# Patient Record
Sex: MALE | Race: BLACK OR AFRICAN AMERICAN | NOT HISPANIC OR LATINO | Employment: STUDENT | URBAN - METROPOLITAN AREA
[De-identification: names, ages, dates, MRNs, and addresses within clinical notes are randomized per-mention and may not be internally consistent; named-entity substitution may affect disease eponyms.]

---

## 2024-08-25 ENCOUNTER — HOSPITAL ENCOUNTER (EMERGENCY)
Facility: HOSPITAL | Age: 24
Discharge: PSYCHIATRIC OR SUBSTANCE USE FACILITY | End: 2024-08-25
Attending: EMERGENCY MEDICINE
Payer: MEDICAID

## 2024-08-25 VITALS
TEMPERATURE: 98 F | HEART RATE: 84 BPM | OXYGEN SATURATION: 100 % | DIASTOLIC BLOOD PRESSURE: 63 MMHG | SYSTOLIC BLOOD PRESSURE: 131 MMHG | RESPIRATION RATE: 18 BRPM

## 2024-08-25 DIAGNOSIS — F22 PARANOID BEHAVIOR: ICD-10-CM

## 2024-08-25 DIAGNOSIS — R45.1 AGITATION: Primary | ICD-10-CM

## 2024-08-25 LAB
ALBUMIN SERPL BCP-MCNC: 4.8 G/DL (ref 3.5–5.2)
ALP SERPL-CCNC: 63 U/L (ref 55–135)
ALT SERPL W/O P-5'-P-CCNC: 14 U/L (ref 10–44)
AMPHET+METHAMPHET UR QL: NEGATIVE
ANION GAP SERPL CALC-SCNC: 12 MMOL/L (ref 8–16)
APAP SERPL-MCNC: <3 UG/ML (ref 10–20)
AST SERPL-CCNC: 21 U/L (ref 10–40)
BARBITURATES UR QL SCN>200 NG/ML: NEGATIVE
BASOPHILS # BLD AUTO: 0.06 K/UL (ref 0–0.2)
BASOPHILS NFR BLD: 0.4 % (ref 0–1.9)
BENZODIAZ UR QL SCN>200 NG/ML: NEGATIVE
BILIRUB SERPL-MCNC: 1.3 MG/DL (ref 0.1–1)
BILIRUB UR QL STRIP: NEGATIVE
BUN SERPL-MCNC: 15 MG/DL (ref 6–20)
BZE UR QL SCN: NEGATIVE
CALCIUM SERPL-MCNC: 9.8 MG/DL (ref 8.7–10.5)
CANNABINOIDS UR QL SCN: ABNORMAL
CHLORIDE SERPL-SCNC: 110 MMOL/L (ref 95–110)
CLARITY UR REFRACT.AUTO: CLEAR
CO2 SERPL-SCNC: 19 MMOL/L (ref 23–29)
COLOR UR AUTO: YELLOW
CREAT SERPL-MCNC: 1 MG/DL (ref 0.5–1.4)
CREAT UR-MCNC: 128 MG/DL (ref 23–375)
DIFFERENTIAL METHOD BLD: ABNORMAL
EOSINOPHIL # BLD AUTO: 0 K/UL (ref 0–0.5)
EOSINOPHIL NFR BLD: 0.1 % (ref 0–8)
ERYTHROCYTE [DISTWIDTH] IN BLOOD BY AUTOMATED COUNT: 13 % (ref 11.5–14.5)
EST. GFR  (NO RACE VARIABLE): >60 ML/MIN/1.73 M^2
ETHANOL SERPL-MCNC: <10 MG/DL
GLUCOSE SERPL-MCNC: 95 MG/DL (ref 70–110)
GLUCOSE UR QL STRIP: NEGATIVE
HCT VFR BLD AUTO: 42.7 % (ref 40–54)
HGB BLD-MCNC: 14.2 G/DL (ref 14–18)
HGB UR QL STRIP: NEGATIVE
IMM GRANULOCYTES # BLD AUTO: 0.04 K/UL (ref 0–0.04)
IMM GRANULOCYTES NFR BLD AUTO: 0.3 % (ref 0–0.5)
KETONES UR QL STRIP: ABNORMAL
LEUKOCYTE ESTERASE UR QL STRIP: NEGATIVE
LYMPHOCYTES # BLD AUTO: 1.9 K/UL (ref 1–4.8)
LYMPHOCYTES NFR BLD: 12.3 % (ref 18–48)
MCH RBC QN AUTO: 28.8 PG (ref 27–31)
MCHC RBC AUTO-ENTMCNC: 33.3 G/DL (ref 32–36)
MCV RBC AUTO: 87 FL (ref 82–98)
METHADONE UR QL SCN>300 NG/ML: NEGATIVE
MONOCYTES # BLD AUTO: 1.1 K/UL (ref 0.3–1)
MONOCYTES NFR BLD: 6.8 % (ref 4–15)
NEUTROPHILS # BLD AUTO: 12.6 K/UL (ref 1.8–7.7)
NEUTROPHILS NFR BLD: 80.1 % (ref 38–73)
NITRITE UR QL STRIP: NEGATIVE
NRBC BLD-RTO: 0 /100 WBC
OPIATES UR QL SCN: NEGATIVE
PCP UR QL SCN>25 NG/ML: NEGATIVE
PH UR STRIP: 6 [PH] (ref 5–8)
PLATELET # BLD AUTO: 314 K/UL (ref 150–450)
PMV BLD AUTO: 10 FL (ref 9.2–12.9)
POTASSIUM SERPL-SCNC: 3.5 MMOL/L (ref 3.5–5.1)
PROT SERPL-MCNC: 8 G/DL (ref 6–8.4)
PROT UR QL STRIP: ABNORMAL
RBC # BLD AUTO: 4.93 M/UL (ref 4.6–6.2)
SALICYLATES SERPL-MCNC: <5 MG/DL (ref 15–30)
SODIUM SERPL-SCNC: 141 MMOL/L (ref 136–145)
SP GR UR STRIP: 1.02 (ref 1–1.03)
TOXICOLOGY INFORMATION: ABNORMAL
TSH SERPL DL<=0.005 MIU/L-ACNC: 1.63 UIU/ML (ref 0.4–4)
URN SPEC COLLECT METH UR: ABNORMAL
WBC # BLD AUTO: 15.71 K/UL (ref 3.9–12.7)

## 2024-08-25 PROCEDURE — 81003 URINALYSIS AUTO W/O SCOPE: CPT | Mod: 59 | Performed by: STUDENT IN AN ORGANIZED HEALTH CARE EDUCATION/TRAINING PROGRAM

## 2024-08-25 PROCEDURE — 80053 COMPREHEN METABOLIC PANEL: CPT | Performed by: STUDENT IN AN ORGANIZED HEALTH CARE EDUCATION/TRAINING PROGRAM

## 2024-08-25 PROCEDURE — 63600175 PHARM REV CODE 636 W HCPCS: Performed by: EMERGENCY MEDICINE

## 2024-08-25 PROCEDURE — 84443 ASSAY THYROID STIM HORMONE: CPT | Performed by: STUDENT IN AN ORGANIZED HEALTH CARE EDUCATION/TRAINING PROGRAM

## 2024-08-25 PROCEDURE — 96372 THER/PROPH/DIAG INJ SC/IM: CPT | Performed by: EMERGENCY MEDICINE

## 2024-08-25 PROCEDURE — 85025 COMPLETE CBC W/AUTO DIFF WBC: CPT | Performed by: STUDENT IN AN ORGANIZED HEALTH CARE EDUCATION/TRAINING PROGRAM

## 2024-08-25 PROCEDURE — 80307 DRUG TEST PRSMV CHEM ANLYZR: CPT | Performed by: STUDENT IN AN ORGANIZED HEALTH CARE EDUCATION/TRAINING PROGRAM

## 2024-08-25 PROCEDURE — 82077 ASSAY SPEC XCP UR&BREATH IA: CPT | Performed by: STUDENT IN AN ORGANIZED HEALTH CARE EDUCATION/TRAINING PROGRAM

## 2024-08-25 PROCEDURE — 99285 EMERGENCY DEPT VISIT HI MDM: CPT | Mod: 25

## 2024-08-25 PROCEDURE — 80179 DRUG ASSAY SALICYLATE: CPT | Performed by: STUDENT IN AN ORGANIZED HEALTH CARE EDUCATION/TRAINING PROGRAM

## 2024-08-25 PROCEDURE — 80143 DRUG ASSAY ACETAMINOPHEN: CPT | Performed by: STUDENT IN AN ORGANIZED HEALTH CARE EDUCATION/TRAINING PROGRAM

## 2024-08-25 RX ORDER — LORAZEPAM 2 MG/ML
2 INJECTION INTRAMUSCULAR
Status: COMPLETED | OUTPATIENT
Start: 2024-08-25 | End: 2024-08-25

## 2024-08-25 RX ORDER — DROPERIDOL 2.5 MG/ML
5 INJECTION, SOLUTION INTRAMUSCULAR; INTRAVENOUS
Status: COMPLETED | OUTPATIENT
Start: 2024-08-25 | End: 2024-08-25

## 2024-08-25 RX ADMIN — LORAZEPAM 2 MG: 2 INJECTION INTRAMUSCULAR; INTRAVENOUS at 07:08

## 2024-08-25 RX ADMIN — DROPERIDOL 5 MG: 2.5 INJECTION, SOLUTION INTRAMUSCULAR; INTRAVENOUS at 07:08

## 2024-08-25 NOTE — ED NOTES
Pt remains in paper scrubs, resting comfortably in stretcher. No acute distress observed. Pt aware of plan of care. PEC complete and in patient's chart. Pt belongings are removed from room, labeled, and locked away. No unnecessary equipment, cords, or wires left in room. Sitter at bedside recording Q 15 minute checks.

## 2024-08-25 NOTE — ED NOTES
Pt lying in stretcher. No acute distress observed + appears to be asleep. Provided w/ pillow and blankets.

## 2024-08-25 NOTE — ED NOTES
"Pt provided w/ apple juice x2, sandwich, and crackers after he stating he was hungry + requesting food. After bringing food to patient, pt requesting food to be taken out of his room + states "it's all processed chemicals, I eat real food for energy." Told RN to get out of his room and "that's why he does not want to deal with women."  "

## 2024-08-25 NOTE — ED NOTES
Pt remains in paper scrubs + pacing room. Pt aware of plan of care. PEC complete and in patient's chart. Pt belongings are removed from room, labeled, and locked away. No unnecessary equipment, cords, or wires left in room. Sitter at bedside recording Q 15 minute checks.

## 2024-08-25 NOTE — ED PROVIDER NOTES
Encounter Date: 8/25/2024       History     Chief Complaint   Patient presents with    Psychiatric Evaluation     Altercation with father. Hx schizophrenia and BPD. Denies SI/HI. EMS reports pt was aggressive initially, no longer aggressive      HPI  24-year-old male with past medical history as noted below being sent in for a altercation with his father.  EMS reports aggressive behavior.    In the emergency department patient is aggressive, frustrated, trying to leave.  He is having tangential speech and some paranoid ideations.    Difficult to obtain history from him.    He says that he took acid 24 hours ago and since then been unable to sleep.  He wants to sleep but says he is not comfortable sleeping in the emergency department.  He says he wants to be with his girlfriend, and released together with his girlfriend.    Review of patient's allergies indicates:  No Known Allergies  Past Medical History:   Diagnosis Date    ADHD (attention deficit hyperactivity disorder)     Asthma     Hospitalized March 2008     Bipolar affective disorder      History reviewed. No pertinent surgical history.  Family History   Adopted: Yes     Social History     Tobacco Use    Smoking status: Never   Substance Use Topics    Alcohol use: No    Drug use: No     Review of Systems    Physical Exam     Initial Vitals [08/25/24 0421]   BP Pulse Resp Temp SpO2   (!) 168/88 (!) 116 16 97.8 °F (36.6 °C) 100 %      MAP       --         Physical Exam    Physical Exam:  CONSTITUTIONAL: Well developed, well nourished, in no acute distress.  HENT: Normocephalic, atraumatic    EYES: Sclerae anicteric   NECK: Supple, no thyroid enlargement  CARDIOVASCULAR: Regular rate and rhythm without any murmurs, gallops, rubs.  RESPIRATORY: Speaking in full sentences. Breathing comfortably. Auscultation of the lungs revealed normal breath sounds b/l, no wheezing, no rales, no rhonchi.  ABDOMEN: Soft and nontender, no masses, no rebound or guarding    NEUROLOGIC: Alert, interacting normally. No facial droop. Voice is clear. Speech is fluent.  MSK: Moving all four extremities.  SKIN: Warm and dry. No visible rash on exposed areas of skin.    PSYCH:  Aggressive, labile mood, intermittently screaming in the emergency department.  Refusing to lay down, refusing to sit      ED Course   Procedures  Labs Reviewed   CBC W/ AUTO DIFFERENTIAL - Abnormal       Result Value    WBC 15.71 (*)     RBC 4.93      Hemoglobin 14.2      Hematocrit 42.7      MCV 87      MCH 28.8      MCHC 33.3      RDW 13.0      Platelets 314      MPV 10.0      Immature Granulocytes 0.3      Gran # (ANC) 12.6 (*)     Immature Grans (Abs) 0.04      Lymph # 1.9      Mono # 1.1 (*)     Eos # 0.0      Baso # 0.06      nRBC 0      Gran % 80.1 (*)     Lymph % 12.3 (*)     Mono % 6.8      Eosinophil % 0.1      Basophil % 0.4      Differential Method Automated     COMPREHENSIVE METABOLIC PANEL - Abnormal    Sodium 141      Potassium 3.5      Chloride 110      CO2 19 (*)     Glucose 95      BUN 15      Creatinine 1.0      Calcium 9.8      Total Protein 8.0      Albumin 4.8      Total Bilirubin 1.3 (*)     Alkaline Phosphatase 63      AST 21      ALT 14      eGFR >60.0      Anion Gap 12     URINALYSIS, REFLEX TO URINE CULTURE - Abnormal    Specimen UA Urine, Clean Catch      Color, UA Yellow      Appearance, UA Clear      pH, UA 6.0      Specific Gravity, UA 1.020      Protein, UA Trace (*)     Glucose, UA Negative      Ketones, UA 2+ (*)     Bilirubin (UA) Negative      Occult Blood UA Negative      Nitrite, UA Negative      Leukocytes, UA Negative      Narrative:     Specimen Source->Urine   ACETAMINOPHEN LEVEL - Abnormal    Acetaminophen (Tylenol), Serum <3.0 (*)    SALICYLATE LEVEL - Abnormal    Salicylate Lvl <5.0 (*)    TSH    TSH 1.629     ALCOHOL,MEDICAL (ETHANOL)    Alcohol, Serum <10     DRUG SCREEN PANEL, URINE EMERGENCY          Imaging Results    None          Medications   droPERidol injection  5 mg (5 mg Intramuscular Given 8/25/24 075)   LORazepam injection 2 mg (2 mg Intramuscular Given 8/25/24 0299)     Medical Decision Making  Risk  Prescription drug management.        24-year-old male with past history as noted coming in with aggressive behavior, this is in the context of acid usage but known history of schizophrenia as well as bipolar disorder.  Continues to be aggressive in the emergency department.    Difficult to redirect her obtain history.    Multiple officers at bedside upon my arrival, for patient and staff safety will give 5 of droperidol IM and 2 of Ativan.  Screening labs for any metabolic hematologic abnormalities.  Patient was walking around, no signs of trauma will examined once it was safe to do so.    Update:  Patient significantly more relaxed now sleeping comfortably in the bed.  Labs thus far only remarkable for elevated white count could be consistent with agitation no clear infectious symptoms.    Further examined, heart lungs, abdomen are benign, no signs of trauma.    Attempted to call numbers in the chart for collateral none have answered, 1 is the wrong number which is his mom's phone number.    Awaiting urine studies anticipate medical clearance for inpatient psychiatric stay for aggressive behavior in the context of known schizophrenia/bipolar.    Update:  UA not remarkable.  Patient has been sleeping comfortably, vitals remained benign.      At this time patient is medically cleared for inpatient psychiatric evaluation for aggressive behavior, disorganized speech and thoughts, paranoid behavior in the setting of schizophrenia and bipolar.        Critical Care Time:     The high probability of sudden, clinically significant deterioration in the patient's condition required the highest level of my preparedness to intervene urgently.    Services included the following: chart data review, reviewing nursing notes and/or old charts, documentation time, consultant collaboration  regarding findings and treatment options, medication orders and management, direct patient care, vital sign assessments and ordering, interpreting and reviewing diagnostic studies/lab tests.     I spent 40 minutes on total Critical Care time, which includes only time during which I was engaged in work directly related to the patient's care, as described above, whether at the bedside or elsewhere in the Emergency Department.  It did not include time spent on separately billable procedures nor did it include the time spent by residents, students, nurses or physician assistants on this patient's care.    Critical Care was needed secondary to the following conditions:  Aggression requiring droperidol and Ativan.                  Medically cleared for psychiatry placement: 8/25/2024 11:12 AM                   Clinical Impression:  Final diagnoses:  [R45.1] Agitation (Primary)  [F22] Paranoid behavior          ED Disposition Condition    Transfer to Psych Facility Stable          ED Prescriptions    None       Follow-up Information    None          Vikas Boyce MD  08/25/24 9825

## 2024-08-25 NOTE — ED NOTES
"I assumed care of this patient at this time. Report received from MARRY Thomas. Pt alert, oriented to person, place, time + delusional and not completely oriented to situation. Pt pacing room + tangential speech, yelling at staff. States he has been here "too long and his basic needs are not being met."Pt remains in paper scrubs. PEC complete and in patient's chart. Pt belongings are removed from room, labeled, and locked away. No unnecessary equipment, cords, or wires left in room. Sitter at bedside recording Q 15 minute checks. Bed low and locked; side rails up x2; call light within reach.   "

## 2024-08-25 NOTE — ED NOTES
Patient arrived to the ED for psych evaluation, patient got into a heated argument with his father. His father called the  then he was brought here. Patient has a hx of schizophrenia and BPD. Patient has been wanded by security and changed into paper scrubs. Patient's belongings have been stored away. Sitter at bedside, patient's room has been de cluttered from all medical equipment.

## 2024-08-25 NOTE — ED NOTES
Pt's significant other arrived to patient's ED room + asked her to leave after patient became extremely agitated and further escalated situation.